# Patient Record
Sex: FEMALE | URBAN - METROPOLITAN AREA
[De-identification: names, ages, dates, MRNs, and addresses within clinical notes are randomized per-mention and may not be internally consistent; named-entity substitution may affect disease eponyms.]

---

## 2018-08-06 ENCOUNTER — APPOINTMENT (RX ONLY)
Dept: URBAN - METROPOLITAN AREA CLINIC 149 | Facility: CLINIC | Age: 33
Setting detail: DERMATOLOGY
End: 2018-08-06

## 2018-08-06 DIAGNOSIS — L74.51 PRIMARY FOCAL HYPERHIDROSIS: ICD-10-CM

## 2018-08-06 PROBLEM — L74.510 PRIMARY FOCAL HYPERHIDROSIS, AXILLA: Status: ACTIVE | Noted: 2018-08-06

## 2018-08-06 PROBLEM — L74.512 PRIMARY FOCAL HYPERHIDROSIS, PALMS: Status: ACTIVE | Noted: 2018-08-06

## 2018-08-06 PROCEDURE — 99202 OFFICE O/P NEW SF 15 MIN: CPT

## 2018-08-06 PROCEDURE — ? COUNSELING

## 2018-08-06 PROCEDURE — ? PRESCRIPTION

## 2018-08-06 RX ORDER — ALUMINUM CHLORIDE 20 %
SOLUTION, NON-ORAL TOPICAL QD
Qty: 1 | Refills: 1 | COMMUNITY
Start: 2018-08-06

## 2018-08-06 RX ADMIN — Medication: at 00:00

## 2018-08-06 ASSESSMENT — LOCATION DETAILED DESCRIPTION DERM
LOCATION DETAILED: RIGHT AXILLARY VAULT
LOCATION DETAILED: LEFT AXILLARY VAULT
LOCATION DETAILED: LEFT RADIAL PALM
LOCATION DETAILED: RIGHT RADIAL PALM

## 2018-08-06 ASSESSMENT — LOCATION SIMPLE DESCRIPTION DERM
LOCATION SIMPLE: RIGHT HAND
LOCATION SIMPLE: RIGHT AXILLARY VAULT
LOCATION SIMPLE: LEFT HAND
LOCATION SIMPLE: LEFT AXILLARY VAULT

## 2018-08-06 ASSESSMENT — LOCATION ZONE DERM
LOCATION ZONE: HAND
LOCATION ZONE: AXILLAE

## 2018-08-06 NOTE — HPI: OTHER
Condition:: hypohidrosis
Please Describe Your Condition:: comes in for a chief complaint of hypohidrosis. Sweating, body odor, sensitive to shaving\\n\\n2 years since it’s been getting worse.\\n\\nTried switching soaps and deodorants\\n\\nPt failed Certain Dry

## 2018-08-09 ENCOUNTER — RX ONLY (OUTPATIENT)
Age: 33
Setting detail: RX ONLY
End: 2018-08-09

## 2018-08-09 RX ORDER — OXYBUTYNIN CHLORIDE 5 MG/1
TABLET ORAL
Qty: 30 | Refills: 2 | Status: ERX | COMMUNITY
Start: 2018-08-09 | End: 2018-09-17

## 2018-09-17 ENCOUNTER — RX ONLY (OUTPATIENT)
Age: 33
Setting detail: RX ONLY
End: 2018-09-17

## 2018-09-17 ENCOUNTER — APPOINTMENT (RX ONLY)
Dept: URBAN - METROPOLITAN AREA CLINIC 149 | Facility: CLINIC | Age: 33
Setting detail: DERMATOLOGY
End: 2018-09-17

## 2018-09-17 DIAGNOSIS — L74.51 PRIMARY FOCAL HYPERHIDROSIS: ICD-10-CM

## 2018-09-17 PROBLEM — F41.9 ANXIETY DISORDER, UNSPECIFIED: Status: ACTIVE | Noted: 2018-09-17

## 2018-09-17 PROBLEM — L74.510 PRIMARY FOCAL HYPERHIDROSIS, AXILLA: Status: ACTIVE | Noted: 2018-09-17

## 2018-09-17 PROBLEM — L74.512 PRIMARY FOCAL HYPERHIDROSIS, PALMS: Status: ACTIVE | Noted: 2018-09-17

## 2018-09-17 PROBLEM — L74.519 PRIMARY FOCAL HYPERHIDROSIS, UNSPECIFIED: Status: ACTIVE | Noted: 2018-09-17

## 2018-09-17 PROBLEM — L74.511 PRIMARY FOCAL HYPERHIDROSIS, FACE: Status: ACTIVE | Noted: 2018-09-17

## 2018-09-17 PROCEDURE — ? PRESCRIPTION

## 2018-09-17 PROCEDURE — 99213 OFFICE O/P EST LOW 20 MIN: CPT

## 2018-09-17 PROCEDURE — ? COUNSELING

## 2018-09-17 RX ORDER — OXYBUTYNIN CHLORIDE 10 MG/1
TABLET, EXTENDED RELEASE ORAL
Qty: 30 | Refills: 12 | Status: ERX | COMMUNITY
Start: 2018-09-17

## 2018-09-17 RX ORDER — TRETINOIN 0.05 G/100G
GEL TOPICAL QD
Qty: 1 | Refills: 10 | Status: ERX | COMMUNITY
Start: 2018-09-17

## 2018-09-17 RX ADMIN — TRETINOIN: 0.05 GEL TOPICAL at 00:00

## 2018-09-17 ASSESSMENT — LOCATION DETAILED DESCRIPTION DERM
LOCATION DETAILED: LEFT AXILLARY VAULT
LOCATION DETAILED: RIGHT RADIAL PALM
LOCATION DETAILED: LEFT ULNAR PALM
LOCATION DETAILED: RIGHT ULNAR DORSAL HAND
LOCATION DETAILED: RIGHT INFERIOR CENTRAL MALAR CHEEK
LOCATION DETAILED: RIGHT AXILLARY VAULT
LOCATION DETAILED: LEFT INFERIOR CENTRAL MALAR CHEEK
LOCATION DETAILED: RIGHT MEDIAL SUPERIOR CHEST
LOCATION DETAILED: LEFT RADIAL DORSAL HAND
LOCATION DETAILED: LEFT MEDIAL SUPERIOR CHEST

## 2018-09-17 ASSESSMENT — LOCATION SIMPLE DESCRIPTION DERM
LOCATION SIMPLE: LEFT HAND
LOCATION SIMPLE: CHEST
LOCATION SIMPLE: RIGHT HAND
LOCATION SIMPLE: LEFT AXILLARY VAULT
LOCATION SIMPLE: RIGHT AXILLARY VAULT
LOCATION SIMPLE: LEFT CHEEK
LOCATION SIMPLE: RIGHT CHEEK

## 2018-09-17 ASSESSMENT — LOCATION ZONE DERM
LOCATION ZONE: FACE
LOCATION ZONE: TRUNK
LOCATION ZONE: AXILLAE
LOCATION ZONE: HAND

## 2018-09-19 ENCOUNTER — RX ONLY (OUTPATIENT)
Age: 33
Setting detail: RX ONLY
End: 2018-09-19

## 2018-09-19 RX ORDER — TRETINOIN 0.5 MG/G
CREAM TOPICAL QD
Qty: 1 | Refills: 0 | Status: ERX | COMMUNITY
Start: 2018-09-19

## 2019-04-16 ENCOUNTER — APPOINTMENT (RX ONLY)
Dept: URBAN - METROPOLITAN AREA CLINIC 149 | Facility: CLINIC | Age: 34
Setting detail: DERMATOLOGY
End: 2019-04-16

## 2019-04-16 DIAGNOSIS — L74.51 PRIMARY FOCAL HYPERHIDROSIS: ICD-10-CM

## 2019-04-16 PROBLEM — L74.510 PRIMARY FOCAL HYPERHIDROSIS, AXILLA: Status: ACTIVE | Noted: 2019-04-16

## 2019-04-16 PROCEDURE — 99213 OFFICE O/P EST LOW 20 MIN: CPT

## 2019-04-16 PROCEDURE — ? PRESCRIPTION

## 2019-04-16 PROCEDURE — ? COUNSELING

## 2019-04-16 RX ORDER — OXYBUTYNIN CHLORIDE 15 MG/1
TABLET, EXTENDED RELEASE ORAL
Qty: 90 | Refills: 1 | Status: ERX | COMMUNITY
Start: 2019-04-16

## 2019-04-16 RX ADMIN — OXYBUTYNIN CHLORIDE: 15 TABLET, EXTENDED RELEASE ORAL at 22:10

## 2019-04-16 ASSESSMENT — LOCATION SIMPLE DESCRIPTION DERM
LOCATION SIMPLE: RIGHT AXILLARY VAULT
LOCATION SIMPLE: LEFT AXILLARY VAULT

## 2019-04-16 ASSESSMENT — LOCATION DETAILED DESCRIPTION DERM
LOCATION DETAILED: LEFT AXILLARY VAULT
LOCATION DETAILED: RIGHT AXILLARY VAULT

## 2019-04-16 ASSESSMENT — LOCATION ZONE DERM: LOCATION ZONE: AXILLAE

## 2019-04-16 NOTE — PROCEDURE: COUNSELING
Medical Necessity Information: LCD Guidelines vary from payer to payer. Please check with your payer's policy to determine medical necessity.
Detail Level: Simple